# Patient Record
Sex: MALE | Race: WHITE | HISPANIC OR LATINO | Employment: UNEMPLOYED | URBAN - METROPOLITAN AREA
[De-identification: names, ages, dates, MRNs, and addresses within clinical notes are randomized per-mention and may not be internally consistent; named-entity substitution may affect disease eponyms.]

---

## 2017-08-19 ENCOUNTER — RESOLUTE PROFESSIONAL BILLING HOSPITAL PROF FEE (OUTPATIENT)
Dept: HOSPITALIST | Facility: MEDICAL CENTER | Age: 47
End: 2017-08-19
Payer: COMMERCIAL

## 2017-08-19 ENCOUNTER — HOSPITAL ENCOUNTER (OUTPATIENT)
Facility: MEDICAL CENTER | Age: 47
End: 2017-08-21
Attending: EMERGENCY MEDICINE | Admitting: HOSPITALIST
Payer: COMMERCIAL

## 2017-08-19 DIAGNOSIS — K72.10 END STAGE LIVER DISEASE (HCC): ICD-10-CM

## 2017-08-19 DIAGNOSIS — N30.00 ACUTE CYSTITIS WITHOUT HEMATURIA: ICD-10-CM

## 2017-08-19 DIAGNOSIS — K76.82 HEPATIC ENCEPHALOPATHY (HCC): ICD-10-CM

## 2017-08-19 DIAGNOSIS — D64.9 CHRONIC ANEMIA: ICD-10-CM

## 2017-08-19 PROBLEM — N18.30 CHRONIC KIDNEY DISEASE, STAGE III (MODERATE): Status: ACTIVE | Noted: 2017-08-19

## 2017-08-19 PROBLEM — N39.0 UTI (URINARY TRACT INFECTION): Status: ACTIVE | Noted: 2017-08-19

## 2017-08-19 PROBLEM — D69.6 THROMBOCYTOPENIA (HCC): Status: ACTIVE | Noted: 2017-08-19

## 2017-08-19 PROBLEM — E87.1 HYPONATREMIA: Status: ACTIVE | Noted: 2017-08-19

## 2017-08-19 LAB
ABO GROUP BLD: NORMAL
ALBUMIN SERPL BCP-MCNC: 2 G/DL (ref 3.2–4.9)
ALBUMIN/GLOB SERPL: 0.6 G/DL
ALP SERPL-CCNC: 123 U/L (ref 30–99)
ALT SERPL-CCNC: 57 U/L (ref 2–50)
AMMONIA PLAS-SCNC: 45 UMOL/L (ref 11–45)
ANION GAP SERPL CALC-SCNC: 6 MMOL/L (ref 0–11.9)
APTT PPP: 63.9 SEC (ref 24.7–36)
AST SERPL-CCNC: 93 U/L (ref 12–45)
BASOPHILS # BLD AUTO: 0.3 % (ref 0–1.8)
BASOPHILS # BLD: 0.02 K/UL (ref 0–0.12)
BILIRUB SERPL-MCNC: 14 MG/DL (ref 0.1–1.5)
BLD GP AB SCN SERPL QL: NORMAL
BUN SERPL-MCNC: 36 MG/DL (ref 8–22)
CALCIUM SERPL-MCNC: 8.4 MG/DL (ref 8.5–10.5)
CHLORIDE SERPL-SCNC: 100 MMOL/L (ref 96–112)
CO2 SERPL-SCNC: 17 MMOL/L (ref 20–33)
COMMENT 1642: NORMAL
CREAT SERPL-MCNC: 1.55 MG/DL (ref 0.5–1.4)
EOSINOPHIL # BLD AUTO: 0 K/UL (ref 0–0.51)
EOSINOPHIL NFR BLD: 0 % (ref 0–6.9)
ERYTHROCYTE [DISTWIDTH] IN BLOOD BY AUTOMATED COUNT: 61.3 FL (ref 35.9–50)
GFR SERPL CREATININE-BSD FRML MDRD: 49 ML/MIN/1.73 M 2
GLOBULIN SER CALC-MCNC: 3.6 G/DL (ref 1.9–3.5)
GLUCOSE SERPL-MCNC: 95 MG/DL (ref 65–99)
HCT VFR BLD AUTO: 25.6 % (ref 42–52)
HGB BLD-MCNC: 8.6 G/DL (ref 14–18)
IMM GRANULOCYTES # BLD AUTO: 0.07 K/UL (ref 0–0.11)
IMM GRANULOCYTES NFR BLD AUTO: 0.9 % (ref 0–0.9)
INR PPP: 4.52 (ref 0.87–1.13)
LIPASE SERPL-CCNC: 21 U/L (ref 11–82)
LYMPHOCYTES # BLD AUTO: 0.22 K/UL (ref 1–4.8)
LYMPHOCYTES NFR BLD: 2.8 % (ref 22–41)
MCH RBC QN AUTO: 33.6 PG (ref 27–33)
MCHC RBC AUTO-ENTMCNC: 33.6 G/DL (ref 33.7–35.3)
MCV RBC AUTO: 100 FL (ref 81.4–97.8)
MONOCYTES # BLD AUTO: 0.34 K/UL (ref 0–0.85)
MONOCYTES NFR BLD AUTO: 4.3 % (ref 0–13.4)
MORPHOLOGY BLD-IMP: NORMAL
NEUTROPHILS # BLD AUTO: 7.35 K/UL (ref 1.82–7.42)
NEUTROPHILS NFR BLD: 91.7 % (ref 44–72)
NRBC # BLD AUTO: 0 K/UL
NRBC BLD AUTO-RTO: 0 /100 WBC
PLATELET # BLD AUTO: 34 K/UL (ref 164–446)
PLATELETS.RETICULATED NFR BLD AUTO: 1.4 K/UL (ref 0.6–13.1)
PMV BLD AUTO: 10.4 FL (ref 9–12.9)
POTASSIUM SERPL-SCNC: 5.2 MMOL/L (ref 3.6–5.5)
PROT SERPL-MCNC: 5.6 G/DL (ref 6–8.2)
PROTHROMBIN TIME: 44.3 SEC (ref 12–14.6)
RBC # BLD AUTO: 2.56 M/UL (ref 4.7–6.1)
RH BLD: NORMAL
SODIUM SERPL-SCNC: 123 MMOL/L (ref 135–145)
WBC # BLD AUTO: 8 K/UL (ref 4.8–10.8)

## 2017-08-19 PROCEDURE — 302146: Performed by: HOSPITALIST

## 2017-08-19 PROCEDURE — 86900 BLOOD TYPING SEROLOGIC ABO: CPT

## 2017-08-19 PROCEDURE — G0378 HOSPITAL OBSERVATION PER HR: HCPCS

## 2017-08-19 PROCEDURE — 85610 PROTHROMBIN TIME: CPT

## 2017-08-19 PROCEDURE — 82140 ASSAY OF AMMONIA: CPT

## 2017-08-19 PROCEDURE — 86850 RBC ANTIBODY SCREEN: CPT

## 2017-08-19 PROCEDURE — 85055 RETICULATED PLATELET ASSAY: CPT

## 2017-08-19 PROCEDURE — 80053 COMPREHEN METABOLIC PANEL: CPT

## 2017-08-19 PROCEDURE — 99223 1ST HOSP IP/OBS HIGH 75: CPT | Performed by: HOSPITALIST

## 2017-08-19 PROCEDURE — 99285 EMERGENCY DEPT VISIT HI MDM: CPT

## 2017-08-19 PROCEDURE — 82272 OCCULT BLD FECES 1-3 TESTS: CPT

## 2017-08-19 PROCEDURE — 83690 ASSAY OF LIPASE: CPT

## 2017-08-19 PROCEDURE — A9270 NON-COVERED ITEM OR SERVICE: HCPCS | Performed by: HOSPITALIST

## 2017-08-19 PROCEDURE — 85025 COMPLETE CBC W/AUTO DIFF WBC: CPT

## 2017-08-19 PROCEDURE — 700102 HCHG RX REV CODE 250 W/ 637 OVERRIDE(OP): Performed by: HOSPITALIST

## 2017-08-19 PROCEDURE — 85730 THROMBOPLASTIN TIME PARTIAL: CPT

## 2017-08-19 PROCEDURE — 86901 BLOOD TYPING SEROLOGIC RH(D): CPT

## 2017-08-19 RX ORDER — CARVEDILOL 6.25 MG/1
6.25 TABLET ORAL 2 TIMES DAILY WITH MEALS
Status: DISCONTINUED | OUTPATIENT
Start: 2017-08-19 | End: 2017-08-20

## 2017-08-19 RX ORDER — OMEPRAZOLE 20 MG/1
40 CAPSULE, DELAYED RELEASE ORAL DAILY
Status: DISCONTINUED | OUTPATIENT
Start: 2017-08-19 | End: 2017-08-21 | Stop reason: HOSPADM

## 2017-08-19 RX ORDER — THIAMINE MONONITRATE (VIT B1) 100 MG
100 TABLET ORAL DAILY
Status: DISCONTINUED | OUTPATIENT
Start: 2017-08-19 | End: 2017-08-21 | Stop reason: HOSPADM

## 2017-08-19 RX ORDER — SUCRALFATE ORAL 1 G/10ML
1 SUSPENSION ORAL
Status: DISCONTINUED | OUTPATIENT
Start: 2017-08-19 | End: 2017-08-21 | Stop reason: HOSPADM

## 2017-08-19 RX ORDER — LACTULOSE 20 G/30ML
30 SOLUTION ORAL 3 TIMES DAILY
Status: DISCONTINUED | OUTPATIENT
Start: 2017-08-19 | End: 2017-08-21 | Stop reason: HOSPADM

## 2017-08-19 RX ADMIN — OMEPRAZOLE 40 MG: 20 CAPSULE, DELAYED RELEASE ORAL at 09:25

## 2017-08-19 RX ADMIN — SUCRALFATE 1 G: 1 SUSPENSION ORAL at 19:07

## 2017-08-19 RX ADMIN — CARVEDILOL 6.25 MG: 6.25 TABLET, FILM COATED ORAL at 09:25

## 2017-08-19 RX ADMIN — CARVEDILOL 6.25 MG: 6.25 TABLET, FILM COATED ORAL at 19:07

## 2017-08-19 RX ADMIN — LACTULOSE 30 ML: 10 SOLUTION ORAL at 21:27

## 2017-08-19 RX ADMIN — SUCRALFATE 1 G: 1 SUSPENSION ORAL at 06:52

## 2017-08-19 RX ADMIN — LACTULOSE 30 ML: 10 SOLUTION ORAL at 09:25

## 2017-08-19 RX ADMIN — SUCRALFATE 1 G: 1 SUSPENSION ORAL at 21:27

## 2017-08-19 RX ADMIN — Medication 100 MG: at 09:25

## 2017-08-19 ASSESSMENT — ENCOUNTER SYMPTOMS
NAUSEA: 0
DIZZINESS: 0
TINGLING: 0
DEPRESSION: 0
FOCAL WEAKNESS: 0
CHILLS: 0
ABDOMINAL PAIN: 0
FEVER: 0
DIARRHEA: 0
SHORTNESS OF BREATH: 0
VOMITING: 0
MYALGIAS: 0
PHOTOPHOBIA: 0
PALPITATIONS: 0
COUGH: 0
WHEEZING: 0
HEADACHES: 0
SORE THROAT: 0

## 2017-08-19 ASSESSMENT — PATIENT HEALTH QUESTIONNAIRE - PHQ9
1. LITTLE INTEREST OR PLEASURE IN DOING THINGS: NOT AT ALL
2. FEELING DOWN, DEPRESSED, IRRITABLE, OR HOPELESS: NOT AT ALL
SUM OF ALL RESPONSES TO PHQ QUESTIONS 1-9: 0
2. FEELING DOWN, DEPRESSED, IRRITABLE, OR HOPELESS: NOT AT ALL
SUM OF ALL RESPONSES TO PHQ9 QUESTIONS 1 AND 2: 0
SUM OF ALL RESPONSES TO PHQ QUESTIONS 1-9: 0
SUM OF ALL RESPONSES TO PHQ9 QUESTIONS 1 AND 2: 0
1. LITTLE INTEREST OR PLEASURE IN DOING THINGS: NOT AT ALL

## 2017-08-19 ASSESSMENT — PAIN SCALES - GENERAL
PAINLEVEL_OUTOF10: 6
PAINLEVEL_OUTOF10: 0

## 2017-08-19 ASSESSMENT — LIFESTYLE VARIABLES
EVER_SMOKED: NEVER
REASON UNABLE TO ASSESS: N
ALCOHOL_USE: NO

## 2017-08-19 NOTE — PROGRESS NOTES
Assessment complete. A&Ox4. C/o pain 0/10. Will be medicated per MAR prn. Discussed POC w/ pt. Educated on medications per MAR. Discussed the importance of calling for needs and before getting OOB to prevent injury/falls. Pt verbalizes understanding. Call light in reach. Bed in lowest position. No other needs identified. Will continue hourly rounding.

## 2017-08-19 NOTE — ED NOTES
Janice from Lab called with critical result of 34 PT at 542. Critical lab result read back to Janice.   Dr. Lemus and Dr Kemp notified of critical lab result at 543.  Critical lab result read back by Dr. Lemus and Dr Kemp.

## 2017-08-19 NOTE — ASSESSMENT & PLAN NOTE
Has improved w holding diuretics, suspect he was dry.  -Resume lasix/spirono at half dose  -Cr in AM  -Monitor Cr Daily  -Watch UOP  -Hold nephrotoxins

## 2017-08-19 NOTE — ASSESSMENT & PLAN NOTE
The patient's encephalopathy has resolved to his baseline. Suspect this is secondary to his medical noncompliance with his lactulose versus under dosing as he has not had consistent bowel movements. I will resume his home medications including his Xifaxan and monitor him closely. The patient carries a known history of alcoholic liver cirrhosis with associated coagulopathy. He stopped drinking in January. Blood alcohol levels drawn at the outlying facility are negative.  -Increase lactulose  -Resume rifaximin

## 2017-08-19 NOTE — ED NOTES
Room 4    BIB [facility] from [place] for GI bleed, Cirrhosis of the liver, AMS,  kidney and liver failure.  Pt has hx of varices and bleeds.  Pt is A&Ox4, Jaundice and states no pain except in LEFT hip.    H&H of 8.4 and 24.4   Potassium at 4.9  Ammonia 99  And liver function HIGH     Given Protonix 80mg IV  Lactose 20mg PO  Rocephin 1G IV   Octreotide 50mcg continues here at [facility]    .  Chief Complaint   Patient presents with   • GI Problem     hx of GI bleeds and varcies

## 2017-08-19 NOTE — PROGRESS NOTES
Patient admitted after midnight by Dr Kemp, please see H&P for full details.    Pt seen and examined by myself, today     Hospital Course:  Froylan Catherine is a 47 y.o. male w/ h/o ETOH liver cirrhosis  admitted 8/19/2017 for altered mentation. Patient carries a history of known alcoholic liver cirrhosis with previous GI bleed secondary to esophageal varices. Upon arrival at the [facility] facility, the patient's mentation had apparently improved. He was noted to have elevated ammonia levels and was transferred to our facility for higher level of care.  The patient admits that he has not been taking his lactulose as ordered. He does have an elevated ammonia level however his mentation has remained stable. At the [facility] facility, the patient had a guaiac positive stool but no evidence of gross bleeding. He states that he has had no alcohol since January and his blood alcohol level is negative    Assessment and plan:  Hepatic encephalopathy (CMS-HCC)  The patient's encephalopathy has resolved. Suspect this is secondary to his medical noncompliance with his lactulose. I will resume his home medications including his Xifaxan and monitor him closely. The patient carries a known history of alcoholic liver cirrhosis with associated coagulopathy. He stopped drinking in January. Blood alcohol levels drawn at the [facility] are negative.    UTI (urinary tract infection)  Patient has a positive urinalysis, he will be covered empirically with ceftriaxone and will monitor urine cultures.    Thrombocytopenia (CMS-HCC)  The patient's platelet levels have dropped in the past year. This is secondary to his liver disease and poor synthetic function. He did have a guaiac positive stool at the [facility] facility but he has no evidence of active GI bleed. We will monitor him closely during this hospitalization and trend his platelet levels.    Hyponatremia  The patient's sodium levels are lower than his previous  hospitalization. Again this is likely secondary to his liver disease. His mentation at the time of my exam at bedside was alert and oriented. We will continue to monitor, we may need to initiate fluid restrictions.    Chronic kidney disease, stage III (moderate)  This is chronic and the patient is at his baseline, we will continue to monitor.          Dispo: cont to monitor, labs in AM, possible dc tomorrow if stable    Patient was discussed with bedside RN/SW\     Vanessa Khan NP

## 2017-08-19 NOTE — PROGRESS NOTES
Pt awake,a&ox4,answer questions,follow commands,pt very jaundiced,pt repositioned,c/o pain on left hip when moved,pillows kept to support,poc explained.

## 2017-08-19 NOTE — ASSESSMENT & PLAN NOTE
The patient's platelet levels have dropped in the past year. This is secondary to his liver disease and poor synthetic function. He did have a guaiac positive stool at the outlying facility but he has no evidence of active GI bleed. We will monitor him closely during this hospitalization and trend his platelet levels.  -Monitor for bleeding

## 2017-08-19 NOTE — PROGRESS NOTES
Pt currently no taking medications.  Pt should be taking   LACTULOSE  20 g tid  CARVEDILOL 6.25 mg bid  OMEPRAZOLE 40 mg daily    No recent antibiotics in last 30 days.

## 2017-08-19 NOTE — IP AVS SNAPSHOT
" Home Care Instructions                                                                                                                  Name:Froylan Catherine  Medical Record Number:[id number]  CSN: [id number]    YOB: 1970   Age: 47 y.o.  Sex: male  HT:1.778 m (5' 10\") WT: 112.5 kg (248 lb 0.3 oz)          Admit Date: 8/19/2017     Discharge Date:   Today's Date: 8/21/2017  Attending Doctor:  Yasmin Kaplan D.O.                  Allergies:  Review of patient's allergies indicates no known allergies.            Discharge Instructions       Discharge Instructions    Discharged to home by car with relative. Discharged via wheelchair, hospital escort: Yes.  Special equipment needed: Wheelchair    Be sure to schedule a follow-up appointment with your primary care doctor or any specialists as instructed.     Discharge Plan:   Diet Plan: Discussed  Activity Level: Discussed  Confirmed Follow up Appointment: Appointment Scheduled  Confirmed Symptoms Management: Discussed  Medication Reconciliation Updated: Yes  Influenza Vaccine Indication: Indicated: Not available from distributor/    I understand that a diet low in cholesterol, fat, and sodium is recommended for good health. Unless I have been given specific instructions below for another diet, I accept this instruction as my diet prescription.   Other diet: Low Sodium Diet    Special Instructions: None    · Is patient discharged on Warfarin / Coumadin?   No     · Is patient Post Blood Transfusion?  No    Depression / Suicide Risk    As you are discharged from this [facility] facility, it is important to learn how to keep safe from harming yourself.    Recognize the warning signs:  · Abrupt changes in personality, positive or negative- including increase in energy   · Giving away possessions  · Change in eating patterns- significant weight changes-  positive or negative  · Change in sleeping patterns- unable to sleep or sleeping all the " time   · Unwillingness or inability to communicate  · Depression  · Unusual sadness, discouragement and loneliness  · Talk of wanting to die  · Neglect of personal appearance   · Rebelliousness- reckless behavior  · Withdrawal from people/activities they love  · Confusion- inability to concentrate     If you or a loved one observes any of these behaviors or has concerns about self-harm, here's what you can do:  · Talk about it- your feelings and reasons for harming yourself  · Remove any means that you might use to hurt yourself (examples: pills, rope, extension cords, firearm)  · Get professional help from the community (Mental Health, Substance Abuse, psychological counseling)  · Do not be alone:Call your Safe Contact- someone whom you trust who will be there for you.  · Call your local CRISIS HOTLINE 832-7158 or 098-508-0828  · Call your local Children's Mobile Crisis Response Team [place] (788) 371-5675 or www.Urjanet  · Call the toll free National Suicide Prevention Hotlines   · National Suicide Prevention Lifeline 442-480-URCA (7347)  · [facility] Network 800-SUICIDE (145-7168)        Follow-up Information     1. Schedule an appointment as soon as possible for a visit with Héctor Cifuentes M.D..    Specialty:  Family Medicine    Contact information    [address] [facility] [address] [address] [phone]          2. Follow up with [facility] transplant center. Go on 8/22/2017.         Discharge Medication Instructions:    Below are the medications your physician expects you to take upon discharge:    Review all your home medications and newly ordered medications with your doctor and/or pharmacist. Follow medication instructions as directed by your doctor and/or pharmacist.    Please keep your medication list with you and share with your physician.               Medication List      CHANGE how you take these medications        Instructions    Morning Afternoon Evening Bedtime    lactulose 10 GM/15ML Soln    What changed:  when to take this   Last time this was given:  30 mL on 8/21/2017  9:13 AM        Take 30 mL by mouth 2 times a day.   Dose:  20 g                          CONTINUE taking these medications        Instructions    Morning Afternoon Evening Bedtime    carvedilol 6.25 MG Tabs   Last time this was given:  6.25 mg on 8/20/2017  6:47 AM   Commonly known as:  COREG        Take 6.25 mg by mouth 2 times a day, with meals.   Dose:  6.25 mg                        omeprazole 20 MG delayed-release capsule   Last time this was given:  40 mg on 8/21/2017  9:14 AM   Commonly known as:  PRILOSEC        Take 40 mg by mouth every day.   Dose:  40 mg                        rifaximin 550 MG Tabs tablet   Last time this was given:  550 mg on 8/21/2017  9:14 AM   Commonly known as:  XIFAXAN        Take 550 mg by mouth 2 Times a Day.   Dose:  550 mg                        therapeutic multivitamin-minerals Tabs        Take 1 Tab by mouth every day.   Dose:  1 Tab                          STOP taking these medications     furosemide 40 MG Tabs   Commonly known as:  LASIX               spironolactone 25 MG Tabs   Commonly known as:  ALDACTONE                    Where to Get Your Medications      Information about where to get these medications is not yet available     ! Ask your nurse or doctor about these medications    - lactulose 10 GM/15ML Soln            Instructions           Diet / Nutrition:    Follow any diet instructions given to you by your doctor or the dietician, including how much salt (sodium) you are allowed each day.    If you are overweight, talk to your doctor about a weight reduction plan.    Activity:    Remain physically active following your doctor's instructions about exercise and activity.    Rest often.     Any time you become even a little tired or short of breath, SIT DOWN and rest.    Worsening Symptoms:    Report any of the following signs and symptoms to the doctor's office immediately:    *Pain  of jaw, arm, or neck  *Chest pain not relieved by medication                               *Dizziness or loss of consciousness  *Difficulty breathing even when at rest   *More tired than usual                                       *Bleeding drainage or swelling of surgical site  *Swelling of feet, ankles, legs or stomach                 *Fever (>100ºF)  *Pink or blood tinged sputum  *Weight gain (3lbs/day or 5lbs /week)           *Shock from internal defibrillator (if applicable)  *Palpitations or irregular heartbeats                *Cool and/or numb extremities    Stroke Awareness    Common Risk Factors for Stroke include:    Age  Atrial Fibrillation  Carotid Artery Stenosis  Diabetes Mellitus  Excessive alcohol consumption  High blood pressure  Overweight   Physical inactivity  Smoking    Warning signs and symptoms of a stroke include:    *Sudden numbness or weakness of the face, arm or leg (especially on one side of the body).  *Sudden confusion, trouble speaking or understanding.  *Sudden trouble seeing in one or both eyes.  *Sudden trouble walking, dizziness, loss of balance or coordination.Sudden severe headache with no known cause.    It is very important to get treatment quickly when a stroke occurs. If you experience any of the above warning signs, call 911 immediately.                   Disclaimer         Quit Smoking / Tobacco Use:    I understand the use of any tobacco products increases my chance of suffering from future heart disease or stroke and could cause other illnesses which may shorten my life. Quitting the use of tobacco products is the single most important thing I can do to improve my health. For further information on smoking / tobacco cessation call a Toll Free Quit Line at 1-378.537.2374 (*National [employer]) or 1-778.408.1158 (American Lung Association) or you can access the web based program at www.lungusa.org.    [place] Tobacco Users Help Line:  (755) 971-1835       Toll Free:  5-451-971-8774  Quit Tobacco Program [facility] (522)179-3228    Crisis Hotline:    National Crisis Hotline:  8-458-BUJBYCE or 1-813.968.9854    [place] Crisis Hotline:    1-512.894.2354 or 583-225-5627    Discharge Survey:   Thank you for choosing [facility]. We hope we did everything we could to make your hospital stay a pleasant one. You may be receiving a phone survey and we would appreciate your time and participation in answering the questions. Your input is very valuable to us in our efforts to improve our service to our patients and their families.        My signature on this form indicates that:    1. I have reviewed and understand the above information.  2. My questions regarding this information have been answered to my satisfaction.  3. I have formulated a plan with my discharge nurse to obtain my prescribed medications for home.                  Disclaimer         __________________________________                     __________       ________                       Patient Signature                                                 Date                    Time

## 2017-08-19 NOTE — ED PROVIDER NOTES
ED Provider Note    Scribed for Carlos Lemus M.D. by Nayeli Berrios. 8/19/2017  4:41 AM    Primary care provider: Pcp Pt States None  Means of arrival: Med Flight  History obtained from: Patient/transferring records  History limited by: None    CHIEF COMPLAINT  Chief Complaint   Patient presents with   • GI Problem     hx of GI bleeds and varcies      HPI  Froylan Catherine is a 47 y.o. male with a history of end stage liver disease who presents to the Emergency Department transferred from [facility] where he originally presented with altered mental status last night. He was found to have Guaiac positive stool and a UTI so he was treated with Rocephin IV 1g, Lactulose, Protonix and Octreotide prior to arrival. His only complaint at this time is left hip pain, and he denies any chest pain, abdominal pain, vomiting or diarrhea. He denies confusion and is alert and oriented by 4. He has not drank since January.     REVIEW OF SYSTEMS  Pertinent positives include hip pain, confusion (resolved).   Pertinent negatives include no chest pain, abdominal pain, vomiting, diarrhea.    All other systems reviewed and negative.      PAST MEDICAL HISTORY   has a past medical history of Congestive heart failure (CMS-HCC); Diabetes (CMS-HCC); and Anemia ([place]).    SURGICAL HISTORY   has past surgical history that includes gastroscopy with banding (10/26/2014) and gastroscopy-endo (N/A, 3/21/2016).    SOCIAL HISTORY  Social History   Substance Use Topics   • Smoking status: Never Smoker    • Smokeless tobacco: None   • Alcohol Use: No      Comment: stopped in January       History   Drug Use No     FAMILY HISTORY  History reviewed. No pertinent family history.    CURRENT MEDICATIONS  Current Outpatient Prescriptions on File Prior to Encounter   Medication Sig Dispense Refill   • lactobacillus granules (LACTINEX/FLORANEX) Pack Take 1 Packet by mouth 3 times a day, with meals. 90 Packet 3   • carvedilol (COREG)  "6.25 MG Tab Take 1 Tab by mouth 2 times a day, with meals. 60 Tab 3   • Calcium Carb-Cholecalciferol (OYSTER SHELL CALCIUM/VITAMIN D) 250-125 MG-UNIT Tab tablet Take 1 Tab by mouth 2 Times a Day. 60 Tab 3   • omeprazole (PRILOSEC) 40 MG delayed-release capsule Take 1 Cap by mouth every day. 30 Cap 3   • sucralfate (CARAFATE) 1 GM/10ML Suspension Take 10 mL by mouth 4 Times a Day,Before Meals and at Bedtime. 300 mL 3   • thiamine (THIAMINE) 100 MG tablet Take 1 Tab by mouth every day. 30 Tab 3   • rifaximin (XIFAXAN) 550 MG TABS tablet Take 1 Tab by mouth 2 Times a Day. (Patient not taking: Reported on 3/18/2016) 60 Tab 2     ALLERGIES  No Known Allergies    PHYSICAL EXAM  VITAL SIGNS: /53 mmHg  Pulse 79  Resp 16  Ht 1.6 m (5' 3\")  Wt 99.791 kg (220 lb)  BMI 38.98 kg/m2  SpO2 98%    Nursing note and vitals reviewed.  Constitutional: Stigmata of end stage liver disease  HENT: Head is normocephalic and atraumatic. Oropharynx is clear and moist without exudate or erythema.   Eyes: Pupils are equal, round, and reactive to light. Scleral icterus noted.   Cardiovascular: Normal rate and regular rhythm. No murmur heard. Normal radial pulses.  Pulmonary/Chest: Breath sounds normal. No wheezes or rales.   Abdominal: Soft and non-tender. No distention    Musculoskeletal: Extremities exhibit normal range of motion without edema or tenderness.   Neurological: Awake, alert and oriented to person, place, and time. No focal deficits noted.  Skin: Skin is jaundiced, warm and dry. No rash.   Psychiatric: Normal mood and affect. Appropriate for clinical situation    DIAGNOSTIC STUDIES / PROCEDURES    LABS  Results for orders placed or performed during the hospital encounter of 08/19/17   CBC WITH DIFFERENTIAL   Result Value Ref Range    WBC 8.0 4.8 - 10.8 K/uL    RBC 2.56 (L) 4.70 - 6.10 M/uL    Hemoglobin 8.6 (L) 14.0 - 18.0 g/dL    Hematocrit 25.6 (L) 42.0 - 52.0 %    .0 (H) 81.4 - 97.8 fL    MCH 33.6 (H) 27.0 - " 33.0 pg    MCHC 33.6 (L) 33.7 - 35.3 g/dL    RDW 61.3 (H) 35.9 - 50.0 fL    Platelet Count 34 (LL) 164 - 446 K/uL    MPV 10.4 9.0 - 12.9 fL    Neutrophils-Polys 91.70 (H) 44.00 - 72.00 %    Lymphocytes 2.80 (L) 22.00 - 41.00 %    Monocytes 4.30 0.00 - 13.40 %    Eosinophils 0.00 0.00 - 6.90 %    Basophils 0.30 0.00 - 1.80 %    Immature Granulocytes 0.90 0.00 - 0.90 %    Nucleated RBC 0.00 /100 WBC    Lymphs (Absolute) 0.22 (L) 1.00 - 4.80 K/uL    Monos (Absolute) 0.34 0.00 - 0.85 K/uL    Eos (Absolute) 0.00 0.00 - 0.51 K/uL    Baso (Absolute) 0.02 0.00 - 0.12 K/uL    Immature Granulocytes (abs) 0.07 0.00 - 0.11 K/uL    NRBC (Absolute) 0.00 K/uL    Neutrophils (Absolute) 7.35 1.82 - 7.42 K/uL   COMP METABOLIC PANEL   Result Value Ref Range    Sodium 123 (L) 135 - 145 mmol/L    Potassium 5.2 3.6 - 5.5 mmol/L    Chloride 100 96 - 112 mmol/L    Co2 17 (L) 20 - 33 mmol/L    Anion Gap 6.0 0.0 - 11.9    Glucose 95 65 - 99 mg/dL    Bun 36 (H) 8 - 22 mg/dL    Creatinine 1.55 (H) 0.50 - 1.40 mg/dL    Calcium 8.4 (L) 8.5 - 10.5 mg/dL    AST(SGOT) 93 (H) 12 - 45 U/L    ALT(SGPT) 57 (H) 2 - 50 U/L    Alkaline Phosphatase 123 (H) 30 - 99 U/L    Total Bilirubin 14.0 (H) 0.1 - 1.5 mg/dL    Albumin 2.0 (L) 3.2 - 4.9 g/dL    Total Protein 5.6 (L) 6.0 - 8.2 g/dL    Globulin 3.6 (H) 1.9 - 3.5 g/dL    A-G Ratio 0.6 g/dL   LIPASE   Result Value Ref Range    Lipase 21 11 - 82 U/L   PROTHROMBIN TIME   Result Value Ref Range    PT 44.3 (H) 12.0 - 14.6 sec    INR 4.52 (H) 0.87 - 1.13   APTT   Result Value Ref Range    APTT 63.9 (H) 24.7 - 36.0 sec   AMMONIA   Result Value Ref Range    Ammonia 45 11 - 45 umol/L   ESTIMATED GFR   Result Value Ref Range    GFR If  59 (A) >60 mL/min/1.73 m 2    GFR If Non African American 49 (A) >60 mL/min/1.73 m 2   PERIPHERAL SMEAR REVIEW   Result Value Ref Range    Peripheral Smear Review see below    IMMATURE PLT FRACTION   Result Value Ref Range    Imm. Plt Fraction 1.4 0.6 - 13.1 K/uL    DIFFERENTIAL COMMENT   Result Value Ref Range    Comments-Diff see below      All labs reviewed by me.    COURSE & MEDICAL DECISION MAKING  Nursing notes, VS, PMSFHx reviewed in chart.     Review of past medical records shows the patient transferred here from an outside hospital. He was last here in March 2016 for an upper GI bleed with esophageal varices      4:41 AM - Reviewed patient's transferring records which showed that he was seen at [facility] for altered mental status. Rectal exam is guaiac positive, brown stool. Hemoglobin was 8.7, ammonia was 99. Sodium 125, creatinine 1.5, platelets 39,000. Bilirubin 12.6, INR 2.6. Urinalysis is consistent with UTI, was treated with Rocephin IV 1 g prior to transfer.     4:41 AM - Patient seen and examined at bedside. Ordered CBC with differential, CMP, lipase, prothrombin time, APTT, ammonia labs to evaluate his symptoms.    4:49 AM - I consulted with the hospitalist, Dr. Kemp, who agreed to admit the patient to the hospital.     6:24 AM - ED testing reveals patient's hemoglobin is 8.6 which is improved from when he was last admitted to [facility] with a hemoglobin of 7.1. His ammonia is now 45, decreased from ammonia of 99 at transferring facility. The patient is hyponatremic with a sodium of 123 which is decreased from prior [facility] hospitalizations where sodium was 130 at its lowest. Patient has been admitted to the clinical decision unit for further evaluation.     DISPOSITION:  Patient will be admitted to hospitalist, Dr. Kemp, in stable condition.    FINAL IMPRESSION  1. Hepatic encephalopathy (CMS-HCC)    2. Acute cystitis without hematuria    3. End stage liver disease (CMS-HCC)    4. Chronic anemia        Nayeli MUIR (Mayuri), am scribing for, and in the presence of, Carlos Lemus M.D..    Electronically signed by: Nayeli Berrios (Mayuri), 8/19/2017    Carlos MUIR M.D. personally performed the services described in this  documentation, as scribed by Nayeli Berrios in my presence, and it is both accurate and complete.    The note accurately reflects work and decisions made by me.  Carlos Lemus  8/19/2017  11:00 AM

## 2017-08-19 NOTE — PROGRESS NOTES
[place]/ [place] tech. Pt as changed from gurney to bed via sheet slideboard. Pt resting in bed. Refuses to have sheet slideboard taken out from under him. Per pt cannot ambulate secondary to L hip pain.

## 2017-08-19 NOTE — ASSESSMENT & PLAN NOTE
The patient's sodium levels are lower than his previous hospitalization. Again this is likely secondary to his liver disease. His mentation at the time of my exam at bedside was alert and oriented. We will continue to monitor, we may need to initiate fluid restrictions.  -Resume Lasix 20  -Spironolactone 50

## 2017-08-19 NOTE — H&P
Hospital Medicine History and Physical    Date of Service  8/19/2017    Chief Complaint  Chief Complaint   Patient presents with   • GI Problem     hx of GI bleeds and varcies        History of Presenting Illness  47 y.o. male [facility] for altered mentation. Patient carries a history of known alcoholic liver cirrhosis with previous GI bleed secondary to esophageal varices. [facility], the patient's mentation had apparently improved. He was noted to have elevated ammonia levels and was transferred to our facility for higher level of care.  The patient admits that he has not been taking his lactulose as ordered. He does have an elevated ammonia level however his mentation has remained stable. [facility] the patient had a guaiac positive stool but no evidence of gross bleeding. He states that he has had no alcohol since January and his blood alcohol level is negative. She reports no fevers, chills, nausea, vomiting. He's had no chest pain, abdominal pain, diarrhea or dysuria.    Primary Care Physician  Pcp Pt States None    Consultants  None.    Code Status  Full    Review of Systems  Review of Systems   Constitutional: Negative for fever and chills.   HENT: Negative for congestion and sore throat.    Eyes: Negative for photophobia.   Respiratory: Negative for cough, shortness of breath and wheezing.    Cardiovascular: Negative for chest pain and palpitations.   Gastrointestinal: Negative for nausea, vomiting, abdominal pain and diarrhea.   Genitourinary: Negative for dysuria.   Musculoskeletal: Negative for myalgias.   Skin: Negative.    Neurological: Negative for dizziness, tingling, focal weakness and headaches.   Psychiatric/Behavioral: Negative for depression and suicidal ideas.        Past Medical History  Past Medical History   Diagnosis Date   • Congestive heart failure (CMS-HCC)    • Diabetes  (CMS-[facility])    • Anemia PMH       Surgical History  Past Surgical History   Procedure Laterality Date   • Gastroscopy with banding  10/26/2014     Performed by Vijay England M.D. at ENDOSCOPY [facility]   • Gastroscopy-endo N/A 3/21/2016     Procedure: GASTROSCOPY-ENDO;  Surgeon: Pete Last M.D.;  Location: SURGERY [facility];  Service:        Medications  No current facility-administered medications on file prior to encounter.     Current Outpatient Prescriptions on File Prior to Encounter   Medication Sig Dispense Refill   • lactobacillus granules (LACTINEX/FLORANEX) Pack Take 1 Packet by mouth 3 times a day, with meals. 90 Packet 3   • carvedilol (COREG) 6.25 MG Tab Take 1 Tab by mouth 2 times a day, with meals. 60 Tab 3   • Calcium Carb-Cholecalciferol (OYSTER SHELL CALCIUM/VITAMIN D) 250-125 MG-UNIT Tab tablet Take 1 Tab by mouth 2 Times a Day. 60 Tab 3   • omeprazole (PRILOSEC) 40 MG delayed-release capsule Take 1 Cap by mouth every day. 30 Cap 3   • sucralfate (CARAFATE) 1 GM/10ML Suspension Take 10 mL by mouth 4 Times a Day,Before Meals and at Bedtime. 300 mL 3   • thiamine (THIAMINE) 100 MG tablet Take 1 Tab by mouth every day. 30 Tab 3   • rifaximin (XIFAXAN) 550 MG TABS tablet Take 1 Tab by mouth 2 Times a Day. (Patient not taking: Reported on 3/18/2016) 60 Tab 2       Family History  History reviewed. No pertinent family history.    Social History  Social History   Substance Use Topics   • Smoking status: Never Smoker    • Smokeless tobacco: None   • Alcohol Use: No      Comment: stopped in January        Allergies  No Known Allergies     Physical Exam  Laboratory   Hemodynamics  No data recorded.      Pulse  Av  Min: 78  Max: 80 Heart Rate (Monitored): 80  Blood Pressure: 116/53 mmHg, NIBP: 110/59 mmHg      Respiratory      Respiration: 20, Pulse Oximetry: 100 %             Physical Exam   Constitutional: He is oriented to person, place, and time. No distress.   HENT:   Head:  Normocephalic and atraumatic.   Right Ear: External ear normal.   Left Ear: External ear normal.   Scleral icterus   Eyes: EOM are normal. Right eye exhibits no discharge. Left eye exhibits no discharge.   Neck: Neck supple. No JVD present.   Cardiovascular: Normal rate, regular rhythm and normal heart sounds.    Pulmonary/Chest: Effort normal and breath sounds normal. No respiratory distress. He exhibits no tenderness.   Abdominal: Soft. Bowel sounds are normal. He exhibits no distension. There is no tenderness.   Musculoskeletal: He exhibits no edema.   Neurological: He is alert and oriented to person, place, and time. No cranial nerve deficit.   Skin: Skin is warm and dry. He is not diaphoretic. No erythema.   Mild jaundice   Psychiatric: He has a normal mood and affect. His behavior is normal.   Nursing note and vitals reviewed.          Recent Labs      08/19/17 0432   SODIUM  123*   POTASSIUM  5.2   CHLORIDE  100   CO2  17*   GLUCOSE  95   BUN  36*   CREATININE  1.55*   CALCIUM  8.4*     Recent Labs      08/19/17 0432   ALTSGPT  57*   ASTSGOT  93*   ALKPHOSPHAT  123*   TBILIRUBIN  14.0*   LIPASE  21   GLUCOSE  95     Recent Labs      08/19/17 0432   APTT  63.9*   INR  4.52*             Lab Results   Component Value Date    TROPONINI <0.01 09/10/2012       Imaging  No results found.   Assessment/Plan     Anticipate that patient will need less than 2 midnights for management of the discussed medical issues.    This dictation was created using voice recognition software. The accuracy of the dictation is limited to the abilities of the software. I expect there may be some errors of grammar and possibly content.    Hepatic encephalopathy (CMS-HCC)  Assessment & Plan  The patient's encephalopathy has resolved. Suspect this is secondary to his medical noncompliance with his lactulose. I will resume his home medications including his Xifaxan and monitor him closely. The patient carries a known history of  alcoholic liver cirrhosis with associated coagulopathy. He stopped drinking in January. Blood alcohol levels drawn at the outlying facility are negative.    UTI (urinary tract infection)  Assessment & Plan  Patient has a positive urinalysis, he will be covered empirically with ceftriaxone and will monitor urine cultures.    Thrombocytopenia (CMS-HCC)  Assessment & Plan  The patient's platelet levels have dropped in the past year. This is secondary to his liver disease and poor synthetic function. [facility] but he has no evidence of active GI bleed. We will monitor him closely during this hospitalization and trend his platelet levels.    Hyponatremia  Assessment & Plan  The patient's sodium levels are lower than his previous hospitalization. Again this is likely secondary to his liver disease. His mentation at the time of my exam at bedside was alert and oriented. We will continue to monitor, we may need to initiate fluid restrictions.    Chronic kidney disease, stage III (moderate)  Assessment & Plan  This is chronic and the patient is at his baseline, we will continue to monitor.      Prophylaxis: Sequential compression devices for DVT prophylaxis, no PPI indicated, stool softeners ordered

## 2017-08-19 NOTE — ASSESSMENT & PLAN NOTE
Patient has a positive urinalysis, he will be covered empirically with ceftriaxone and will monitor urine cultures.  -Will treat for 5 days given this is all located UTI.  -Follow up cultures

## 2017-08-20 PROBLEM — N17.9 AKI (ACUTE KIDNEY INJURY) (HCC): Status: ACTIVE | Noted: 2017-08-19

## 2017-08-20 PROBLEM — D68.9 COAGULOPATHY (HCC): Status: ACTIVE | Noted: 2017-08-20

## 2017-08-20 PROBLEM — D53.9 MACROCYTIC ANEMIA: Status: ACTIVE | Noted: 2017-08-20

## 2017-08-20 LAB
ALBUMIN SERPL BCP-MCNC: 1.6 G/DL (ref 3.2–4.9)
ALBUMIN/GLOB SERPL: 0.6 G/DL
ALP SERPL-CCNC: 98 U/L (ref 30–99)
ALT SERPL-CCNC: 46 U/L (ref 2–50)
ANION GAP SERPL CALC-SCNC: 4 MMOL/L (ref 0–11.9)
AST SERPL-CCNC: 69 U/L (ref 12–45)
BILIRUB SERPL-MCNC: 12.3 MG/DL (ref 0.1–1.5)
BUN SERPL-MCNC: 31 MG/DL (ref 8–22)
CALCIUM SERPL-MCNC: 8.1 MG/DL (ref 8.5–10.5)
CHLORIDE SERPL-SCNC: 102 MMOL/L (ref 96–112)
CO2 SERPL-SCNC: 19 MMOL/L (ref 20–33)
CREAT SERPL-MCNC: 1.25 MG/DL (ref 0.5–1.4)
ERYTHROCYTE [DISTWIDTH] IN BLOOD BY AUTOMATED COUNT: 64.8 FL (ref 35.9–50)
GFR SERPL CREATININE-BSD FRML MDRD: >60 ML/MIN/1.73 M 2
GLOBULIN SER CALC-MCNC: 2.9 G/DL (ref 1.9–3.5)
GLUCOSE SERPL-MCNC: 99 MG/DL (ref 65–99)
HCT VFR BLD AUTO: 20.8 % (ref 42–52)
HGB BLD-MCNC: 7 G/DL (ref 14–18)
MCH RBC QN AUTO: 34 PG (ref 27–33)
MCHC RBC AUTO-ENTMCNC: 33.7 G/DL (ref 33.7–35.3)
MCV RBC AUTO: 101 FL (ref 81.4–97.8)
MORPHOLOGY BLD-IMP: NORMAL
PLATELET # BLD AUTO: 25 K/UL (ref 164–446)
PLATELET BLD QL SMEAR: NORMAL
PLATELETS.RETICULATED NFR BLD AUTO: 1.7 K/UL (ref 0.6–13.1)
PMV BLD AUTO: 9.7 FL (ref 9–12.9)
POTASSIUM SERPL-SCNC: 4.7 MMOL/L (ref 3.6–5.5)
PROT SERPL-MCNC: 4.5 G/DL (ref 6–8.2)
RBC # BLD AUTO: 2.06 M/UL (ref 4.7–6.1)
SODIUM SERPL-SCNC: 125 MMOL/L (ref 135–145)
WBC # BLD AUTO: 5.2 K/UL (ref 4.8–10.8)

## 2017-08-20 PROCEDURE — A9270 NON-COVERED ITEM OR SERVICE: HCPCS | Performed by: HOSPITALIST

## 2017-08-20 PROCEDURE — 36415 COLL VENOUS BLD VENIPUNCTURE: CPT

## 2017-08-20 PROCEDURE — A9270 NON-COVERED ITEM OR SERVICE: HCPCS | Performed by: INTERNAL MEDICINE

## 2017-08-20 PROCEDURE — 85027 COMPLETE CBC AUTOMATED: CPT

## 2017-08-20 PROCEDURE — 700102 HCHG RX REV CODE 250 W/ 637 OVERRIDE(OP): Performed by: INTERNAL MEDICINE

## 2017-08-20 PROCEDURE — 85055 RETICULATED PLATELET ASSAY: CPT

## 2017-08-20 PROCEDURE — 96374 THER/PROPH/DIAG INJ IV PUSH: CPT

## 2017-08-20 PROCEDURE — 700102 HCHG RX REV CODE 250 W/ 637 OVERRIDE(OP): Performed by: HOSPITALIST

## 2017-08-20 PROCEDURE — 80053 COMPREHEN METABOLIC PANEL: CPT

## 2017-08-20 PROCEDURE — 700111 HCHG RX REV CODE 636 W/ 250 OVERRIDE (IP): Performed by: INTERNAL MEDICINE

## 2017-08-20 PROCEDURE — 99232 SBSQ HOSP IP/OBS MODERATE 35: CPT | Performed by: INTERNAL MEDICINE

## 2017-08-20 PROCEDURE — G0378 HOSPITAL OBSERVATION PER HR: HCPCS

## 2017-08-20 RX ORDER — LACTULOSE 10 G/15ML
20 SOLUTION ORAL DAILY
Status: ON HOLD | COMMUNITY
End: 2017-08-21

## 2017-08-20 RX ORDER — M-VIT,TX,IRON,MINS/CALC/FOLIC 27MG-0.4MG
1 TABLET ORAL DAILY
COMMUNITY

## 2017-08-20 RX ORDER — FUROSEMIDE 20 MG/1
20 TABLET ORAL
Status: DISCONTINUED | OUTPATIENT
Start: 2017-08-21 | End: 2017-08-21

## 2017-08-20 RX ORDER — PHYTONADIONE 5 MG/1
2.5 TABLET ORAL DAILY
Status: DISCONTINUED | OUTPATIENT
Start: 2017-08-20 | End: 2017-08-21 | Stop reason: HOSPADM

## 2017-08-20 RX ORDER — SPIRONOLACTONE 25 MG/1
100 TABLET ORAL DAILY
Status: ON HOLD | COMMUNITY
End: 2017-08-21

## 2017-08-20 RX ORDER — FUROSEMIDE 10 MG/ML
20 INJECTION INTRAMUSCULAR; INTRAVENOUS
Status: DISCONTINUED | OUTPATIENT
Start: 2017-08-20 | End: 2017-08-20

## 2017-08-20 RX ORDER — PROPRANOLOL HYDROCHLORIDE 10 MG/1
10 TABLET ORAL EVERY 8 HOURS
Status: DISCONTINUED | OUTPATIENT
Start: 2017-08-20 | End: 2017-08-21 | Stop reason: HOSPADM

## 2017-08-20 RX ORDER — OMEPRAZOLE 20 MG/1
40 CAPSULE, DELAYED RELEASE ORAL DAILY
COMMUNITY

## 2017-08-20 RX ORDER — CARVEDILOL 6.25 MG/1
6.25 TABLET ORAL 2 TIMES DAILY WITH MEALS
COMMUNITY

## 2017-08-20 RX ORDER — OMEPRAZOLE 20 MG/1
40 CAPSULE, DELAYED RELEASE ORAL DAILY
Status: DISCONTINUED | OUTPATIENT
Start: 2017-08-20 | End: 2017-08-20

## 2017-08-20 RX ORDER — SPIRONOLACTONE 50 MG/1
50 TABLET, FILM COATED ORAL
Status: DISCONTINUED | OUTPATIENT
Start: 2017-08-20 | End: 2017-08-21

## 2017-08-20 RX ORDER — FUROSEMIDE 40 MG/1
40 TABLET ORAL DAILY
Status: ON HOLD | COMMUNITY
End: 2017-08-21

## 2017-08-20 RX ADMIN — SUCRALFATE 1 G: 1 SUSPENSION ORAL at 18:30

## 2017-08-20 RX ADMIN — OMEPRAZOLE 40 MG: 20 CAPSULE, DELAYED RELEASE ORAL at 09:15

## 2017-08-20 RX ADMIN — LACTULOSE 30 ML: 10 SOLUTION ORAL at 16:18

## 2017-08-20 RX ADMIN — CARVEDILOL 6.25 MG: 6.25 TABLET, FILM COATED ORAL at 06:47

## 2017-08-20 RX ADMIN — SUCRALFATE 1 G: 1 SUSPENSION ORAL at 21:04

## 2017-08-20 RX ADMIN — SUCRALFATE 1 G: 1 SUSPENSION ORAL at 11:50

## 2017-08-20 RX ADMIN — Medication 100 MG: at 09:14

## 2017-08-20 RX ADMIN — LACTULOSE 30 ML: 10 SOLUTION ORAL at 21:04

## 2017-08-20 RX ADMIN — RIFAXIMIN 550 MG: 550 TABLET ORAL at 21:04

## 2017-08-20 RX ADMIN — PHYTONADIONE 2.5 MG: 5 TABLET ORAL at 11:51

## 2017-08-20 RX ADMIN — SPIRONOLACTONE 50 MG: 50 TABLET ORAL at 11:51

## 2017-08-20 RX ADMIN — SUCRALFATE 1 G: 1 SUSPENSION ORAL at 06:49

## 2017-08-20 RX ADMIN — LACTULOSE 30 ML: 10 SOLUTION ORAL at 09:15

## 2017-08-20 RX ADMIN — FUROSEMIDE 20 MG: 10 INJECTION, SOLUTION INTRAMUSCULAR; INTRAVENOUS at 11:50

## 2017-08-20 ASSESSMENT — ENCOUNTER SYMPTOMS
DIARRHEA: 0
SORE THROAT: 0
BLOOD IN STOOL: 0
BACK PAIN: 0
FEVER: 0
DEPRESSION: 0
ABDOMINAL PAIN: 0
BRUISES/BLEEDS EASILY: 0
ROS GI COMMENTS: DISTENTION
MEMORY LOSS: 1
SHORTNESS OF BREATH: 0
PALPITATIONS: 0
FOCAL WEAKNESS: 0
WEAKNESS: 1
HEADACHES: 0
DIZZINESS: 0
HALLUCINATIONS: 0
VOMITING: 0
HEARTBURN: 0
MYALGIAS: 0
COUGH: 0
CHILLS: 0
NAUSEA: 0

## 2017-08-20 ASSESSMENT — PATIENT HEALTH QUESTIONNAIRE - PHQ9
2. FEELING DOWN, DEPRESSED, IRRITABLE, OR HOPELESS: NOT AT ALL
1. LITTLE INTEREST OR PLEASURE IN DOING THINGS: NOT AT ALL
SUM OF ALL RESPONSES TO PHQ9 QUESTIONS 1 AND 2: 0
SUM OF ALL RESPONSES TO PHQ QUESTIONS 1-9: 0

## 2017-08-20 ASSESSMENT — PAIN SCALES - GENERAL: PAINLEVEL_OUTOF10: 2

## 2017-08-20 ASSESSMENT — LIFESTYLE VARIABLES: DO YOU DRINK ALCOHOL: NO

## 2017-08-20 NOTE — ASSESSMENT & PLAN NOTE
Has had bleeding the past, no evidence of this currently.  -Continue propranolol  -Hold all anticoagulation

## 2017-08-20 NOTE — PROGRESS NOTES
No BM since last night, patient is generalized weakness. Monitor Intake and output. Will give patient a bed bath.

## 2017-08-20 NOTE — ASSESSMENT & PLAN NOTE
No evidence of A. fib during this hospital stay.  -Too high-risk for intake regulation given coagulopathy in cirrhosis and history of esophageal bleeding.  -Rate is controlled.  -Continue propranolol

## 2017-08-20 NOTE — PROGRESS NOTES
Family members at bedside, got a schedule Liver Specialist in [facility] Tuesday morning. Family already arrange there trip .

## 2017-08-20 NOTE — PROGRESS NOTES
Pt has small smear of BM.  Pt cleaned and repositioned.  Pt given nightly medications.  No questions or concerns to address at this time.

## 2017-08-20 NOTE — PROGRESS NOTES
Pt checked for incontinence.  Pads still dry.  Pt assisted in urinal usage.  During assistance, pt felt warm.  Pt temp checked orally @ 100.2.  Pt was covered up with approx 5 blankets.  Will remove all blanket but 1 and recheck temp.  Will continue to monitor situation.

## 2017-08-20 NOTE — PROGRESS NOTES
Patient is awake, BP was low. Did held medications. On reverse trendelenburg. Will check BP in 15 minutes.

## 2017-08-20 NOTE — ASSESSMENT & PLAN NOTE
End stage liver disease.  He is meeting with the GI physician, liver specialist in [place] on Wednesday.  -Very poor prognosis  -Maximize medications to stabilize for travel to [place] for this apt  -I discussed goals of care in detail with him and his mother at bedside.  Spent 35min during this discussion. He wishes to remain full code, has not established limites on duration on life support but I encouraged him to do this.

## 2017-08-20 NOTE — PROGRESS NOTES
[facility] Hospitalist Progress Note    Date of Service: 2017    Chief Complaint  47 y.o. male w/ h/o ETOH cirrhosis admitted 2017 for altered mentation. He has known complications of coagulopathy, anemia, hx of GI bleed secondary to esophageal varices, encephalopathy in the past. [facility] was noted to have elevated ammonia levels and was transferred to our facility for higher level of care.  He has missed rare doses of lactulose, he has not been having bowel movements. [facility] had a guaiac positive stool but no evidence of gross bleeding. He states that he has had no alcohol since January and his blood alcohol level is negative.    Interval Problem Update  : Mental status improved. He will drop, no melena or hematochezia.    Consultants/Specialty  None    Disposition  He has an appointment with a liver specialist in [place] on Wednesday.  We'll maximize medications, and sure hemoglobin is stable and hope to [place] to make this appointment.        Review of Systems   Constitutional: Positive for malaise/fatigue. Negative for fever and chills.   HENT: Negative for sore throat.    Respiratory: Negative for cough and shortness of breath.    Cardiovascular: Negative for chest pain and palpitations.   Gastrointestinal: Negative for heartburn, nausea, vomiting, abdominal pain, diarrhea, blood in stool and melena.        Distention   Genitourinary: Negative for dysuria and frequency.        Dark urine   Musculoskeletal: Negative for myalgias and back pain.   Neurological: Positive for weakness. Negative for dizziness, focal weakness and headaches.   Endo/Heme/Allergies: Does not bruise/bleed easily.   Psychiatric/Behavioral: Positive for memory loss. Negative for depression and hallucinations.        Confusion, improved today.   All other systems reviewed and are negative.     Physical Exam  Laboratory/Imaging   Hemodynamics  Temp (24hrs), Av.1 °C  (98.7 °F), Min:36.6 °C (97.9 °F), Max:37.9 °C (100.2 °F)   Temperature: 37.1 °C (98.8 °F)  Pulse  Av.3  Min: 70  Max: 91    Blood Pressure: 112/58 mmHg      Respiratory      Respiration: 18, Pulse Oximetry: 98 %        RUL Breath Sounds: Diminished, RML Breath Sounds: Diminished, RLL Breath Sounds: Diminished, JONO Breath Sounds: Diminished, LLL Breath Sounds: Diminished    Fluids    Intake/Output Summary (Last 24 hours) at 17 1421  Last data filed at 17 1000   Gross per 24 hour   Intake    280 ml   Output   1250 ml   Net   -970 ml       Nutrition  Orders Placed This Encounter   Procedures   • Diet Order     Standing Status: Standing      Number of Occurrences: 1      Standing Expiration Date:      Order Specific Question:  Diet:     Answer:  Hepatic [9]     Order Specific Question:  Diet:     Answer:  Renal [8]     Physical Exam   Constitutional: He is oriented to person, place, and time. He appears well-developed and well-nourished. No distress.   Chronically ill-appearing.   HENT:   Head: Normocephalic.   Mouth/Throat: No oropharyngeal exudate.   Eyes: Conjunctivae are normal. Pupils are equal, round, and reactive to light.   Neck: Normal range of motion. No tracheal deviation present.   Cardiovascular: Normal rate and regular rhythm.    No murmur heard.  Pulmonary/Chest: Effort normal. No respiratory distress. He has no wheezes. He exhibits no tenderness.   Abdominal: Soft. He exhibits distension. There is no tenderness. There is no guarding.   Musculoskeletal: Normal range of motion. He exhibits no edema or tenderness.   Lymphadenopathy:     He has no cervical adenopathy.   Neurological: He is alert and oriented to person, place, and time. No cranial nerve deficit.   Skin: Skin is warm and dry. No rash noted.   Psychiatric:   Poor insight.   Nursing note and vitals reviewed.      Recent Labs      17   0432  17   0140   WBC  8.0  5.2   RBC  2.56*  2.06*   HEMOGLOBIN  8.6*  7.0*    HEMATOCRIT  25.6*  20.8*   MCV  100.0*  101.0*   MCH  33.6*  34.0*   MCHC  33.6*  33.7   RDW  61.3*  64.8*   PLATELETCT  34*  25*   MPV  10.4  9.7     Recent Labs      08/19/17   0432  08/20/17   0140   SODIUM  123*  125*   POTASSIUM  5.2  4.7   CHLORIDE  100  102   CO2  17*  19*   GLUCOSE  95  99   BUN  36*  31*   CREATININE  1.55*  1.25   CALCIUM  8.4*  8.1*     Recent Labs      08/19/17   0432   APTT  63.9*   INR  4.52*                  Assessment/Plan     * Hepatic encephalopathy (CMS-HCC)  Assessment & Plan  The patient's encephalopathy has resolved to his baseline. Suspect this is secondary to his medical noncompliance with his lactulose versus under dosing as he has not had consistent bowel movements. I will resume his home medications including his Xifaxan and monitor him closely. The patient carries a known history of alcoholic liver cirrhosis with associated coagulopathy. He stopped drinking in January. Blood alcohol levels drawn at the outlying facility are negative.  -Increase lactulose  -Resume rifaximin    UTI (urinary tract infection)  Assessment & Plan  Patient has a positive urinalysis, he will be covered empirically with ceftriaxone and will monitor urine cultures.  -Will treat for 5 days given this is all located UTI.  -Follow up cultures    Liver cirrhosis (CMS-HCC) (present on admission)  Assessment & Plan  End stage liver disease.  He is meeting with the GI physician, liver specialist in [place] on Wednesday.  -Very poor prognosis  -Maximize medications to stabilize for travel to [place] for this apt  -I discussed goals of care in detail with him and his mother at bedside.  Spent 35min during this discussion. He wishes to remain full code, has not established limites on duration on life support but I encouraged him to do this.    Esophageal varices (CMS-HCC) (present on admission)  Assessment & Plan  Has had bleeding the past, no evidence of this currently.  -Continue propranolol  -Hold  all anticoagulation    Protein-calorie malnutrition, severe (CMS-[facility]) (present on admission)  Assessment & Plan  In addition to poor synthetic function    Paroxysmal atrial fibrillation (CMS-[facility]) (present on admission)  Assessment & Plan  No evidence of A. fib during this hospital stay.  -Too high-risk for intake regulation given coagulopathy in cirrhosis and history of esophageal bleeding.  -Rate is controlled.  -Continue propranolol    Thrombocytopenia (CMS-HCC)  Assessment & Plan  The patient's platelet levels have dropped in the past year. This is secondary to his liver disease and poor synthetic function. He did have a guaiac positive stool at the outlying facility but he has no evidence of active GI bleed. We will monitor him closely during this hospitalization and trend his platelet levels.  -Monitor for bleeding    Hyponatremia  Assessment & Plan  The patient's sodium levels are lower than his previous hospitalization. Again this is likely secondary to his liver disease. His mentation at the time of my exam at bedside was alert and oriented. We will continue to monitor, we may need to initiate fluid restrictions.  -Resume Lasix 20  -Spironolactone 50    GODWIN (acute kidney injury) (CMS-HCC)  Assessment & Plan  Has improved w holding diuretics, suspect he was dry.  -Resume lasix/spirono at half dose  -Cr in AM  -Monitor Cr Daily  -Watch UOP  -Hold nephrotoxins      Coagulopathy (CMS-HCC)  Assessment & Plan  Due to liver disease, inr >  -Start daily Vit K 2.5 mg  -Monitor for bleeding    Macrocytic anemia  Assessment & Plan  No evidence of bleeding.  Baseline is near 8. This is related to cirrhosis and also history of GI bleeding, no evidence of active bleeding.  -Repeat hemoglobin in the morning  -Transfuse if less than 7        Radiology images reviewed, Medications reviewed and Labs reviewed  Garzon catheter: No Garzon      DVT Prophylaxis: Contraindicated - High bleeding risk (Coagulopathy)  DVT prophylaxis -  mechanical: SCDs              35 minutes were spent discussing goals of care with family at bedside, this was in addition to the time of the initial visit with the patient for total time of 65min.  All questions answered.  Greater than 50% of this time was at the patient's bedside.

## 2017-08-20 NOTE — PROGRESS NOTES
Calling [facility] in [place]. Its close un able to update patient Med Rec. Got list of his medication from her mother.

## 2017-08-20 NOTE — PROGRESS NOTES
Notify MD about BP, continue monitoring patient. Drinking water and watching TV. No discomfort. Alert and oriented.

## 2017-08-20 NOTE — ASSESSMENT & PLAN NOTE
No evidence of bleeding.  Baseline is near 8. This is related to cirrhosis and also history of GI bleeding, no evidence of active bleeding.  -Repeat hemoglobin in the morning  -Transfuse if less than 7

## 2017-08-20 NOTE — PROGRESS NOTES
Call from Oriana in the lab.  Pt with critical value of platelets @ 25,000.  Level verified with readback.    Paged hospitalist @ 0216.  Returned call @ 0218 from Natasha Oliver NP.  NP notified of platelet count 25,000.  No new orders at this time.

## 2017-08-21 VITALS
OXYGEN SATURATION: 96 % | RESPIRATION RATE: 18 BRPM | HEART RATE: 61 BPM | DIASTOLIC BLOOD PRESSURE: 53 MMHG | BODY MASS INDEX: 35.51 KG/M2 | SYSTOLIC BLOOD PRESSURE: 96 MMHG | WEIGHT: 248.02 LBS | HEIGHT: 70 IN | TEMPERATURE: 98 F

## 2017-08-21 PROBLEM — D53.9 MACROCYTIC ANEMIA: Chronic | Status: ACTIVE | Noted: 2017-08-20

## 2017-08-21 PROBLEM — D68.9 COAGULOPATHY (HCC): Chronic | Status: ACTIVE | Noted: 2017-08-20

## 2017-08-21 PROBLEM — E87.1 HYPONATREMIA: Chronic | Status: ACTIVE | Noted: 2017-08-19

## 2017-08-21 PROBLEM — D69.6 THROMBOCYTOPENIA (HCC): Chronic | Status: ACTIVE | Noted: 2017-08-19

## 2017-08-21 LAB
ALBUMIN SERPL BCP-MCNC: 1.5 G/DL (ref 3.2–4.9)
ALBUMIN/GLOB SERPL: 0.5 G/DL
ALP SERPL-CCNC: 87 U/L (ref 30–99)
ALT SERPL-CCNC: 41 U/L (ref 2–50)
ANION GAP SERPL CALC-SCNC: 3 MMOL/L (ref 0–11.9)
AST SERPL-CCNC: 68 U/L (ref 12–45)
BILIRUB SERPL-MCNC: 12.2 MG/DL (ref 0.1–1.5)
BUN SERPL-MCNC: 27 MG/DL (ref 8–22)
CALCIUM SERPL-MCNC: 8.1 MG/DL (ref 8.5–10.5)
CHLORIDE SERPL-SCNC: 99 MMOL/L (ref 96–112)
CO2 SERPL-SCNC: 19 MMOL/L (ref 20–33)
CREAT SERPL-MCNC: 1.19 MG/DL (ref 0.5–1.4)
ERYTHROCYTE [DISTWIDTH] IN BLOOD BY AUTOMATED COUNT: 64.6 FL (ref 35.9–50)
GFR SERPL CREATININE-BSD FRML MDRD: >60 ML/MIN/1.73 M 2
GLOBULIN SER CALC-MCNC: 3.1 G/DL (ref 1.9–3.5)
GLUCOSE SERPL-MCNC: 93 MG/DL (ref 65–99)
HCT VFR BLD AUTO: 21.5 % (ref 42–52)
HGB BLD-MCNC: 7.1 G/DL (ref 14–18)
MCH RBC QN AUTO: 33.3 PG (ref 27–33)
MCHC RBC AUTO-ENTMCNC: 33 G/DL (ref 33.7–35.3)
MCV RBC AUTO: 100.9 FL (ref 81.4–97.8)
MORPHOLOGY BLD-IMP: NORMAL
PLATELET # BLD AUTO: 26 K/UL (ref 164–446)
PLATELETS.RETICULATED NFR BLD AUTO: 1.8 K/UL (ref 0.6–13.1)
PMV BLD AUTO: 10.1 FL (ref 9–12.9)
POTASSIUM SERPL-SCNC: 4.2 MMOL/L (ref 3.6–5.5)
PROT SERPL-MCNC: 4.6 G/DL (ref 6–8.2)
RBC # BLD AUTO: 2.13 M/UL (ref 4.7–6.1)
SODIUM SERPL-SCNC: 121 MMOL/L (ref 135–145)
WBC # BLD AUTO: 6.7 K/UL (ref 4.8–10.8)

## 2017-08-21 PROCEDURE — A9270 NON-COVERED ITEM OR SERVICE: HCPCS | Performed by: HOSPITALIST

## 2017-08-21 PROCEDURE — 700102 HCHG RX REV CODE 250 W/ 637 OVERRIDE(OP): Performed by: HOSPITALIST

## 2017-08-21 PROCEDURE — 85027 COMPLETE CBC AUTOMATED: CPT

## 2017-08-21 PROCEDURE — 36415 COLL VENOUS BLD VENIPUNCTURE: CPT

## 2017-08-21 PROCEDURE — G0378 HOSPITAL OBSERVATION PER HR: HCPCS

## 2017-08-21 PROCEDURE — 85055 RETICULATED PLATELET ASSAY: CPT

## 2017-08-21 PROCEDURE — 99239 HOSP IP/OBS DSCHRG MGMT >30: CPT | Performed by: INTERNAL MEDICINE

## 2017-08-21 PROCEDURE — A9270 NON-COVERED ITEM OR SERVICE: HCPCS | Performed by: INTERNAL MEDICINE

## 2017-08-21 PROCEDURE — 80053 COMPREHEN METABOLIC PANEL: CPT

## 2017-08-21 PROCEDURE — 700102 HCHG RX REV CODE 250 W/ 637 OVERRIDE(OP): Performed by: INTERNAL MEDICINE

## 2017-08-21 RX ORDER — LACTULOSE 10 G/15ML
20 SOLUTION ORAL 2 TIMES DAILY
Qty: 1 BOTTLE | Refills: 0
Start: 2017-08-21

## 2017-08-21 RX ADMIN — PHYTONADIONE 2.5 MG: 5 TABLET ORAL at 09:15

## 2017-08-21 RX ADMIN — LACTULOSE 30 ML: 10 SOLUTION ORAL at 15:27

## 2017-08-21 RX ADMIN — FUROSEMIDE 20 MG: 20 TABLET ORAL at 09:14

## 2017-08-21 RX ADMIN — Medication 100 MG: at 09:17

## 2017-08-21 RX ADMIN — LACTULOSE 30 ML: 10 SOLUTION ORAL at 09:13

## 2017-08-21 RX ADMIN — SUCRALFATE 1 G: 1 SUSPENSION ORAL at 12:10

## 2017-08-21 RX ADMIN — OMEPRAZOLE 40 MG: 20 CAPSULE, DELAYED RELEASE ORAL at 09:14

## 2017-08-21 RX ADMIN — SPIRONOLACTONE 50 MG: 50 TABLET ORAL at 09:17

## 2017-08-21 RX ADMIN — SUCRALFATE 1 G: 1 SUSPENSION ORAL at 09:13

## 2017-08-21 RX ADMIN — RIFAXIMIN 550 MG: 550 TABLET ORAL at 09:14

## 2017-08-21 ASSESSMENT — LIFESTYLE VARIABLES
DO YOU DRINK ALCOHOL: NO
EVER_SMOKED: NEVER

## 2017-08-21 ASSESSMENT — PATIENT HEALTH QUESTIONNAIRE - PHQ9
SUM OF ALL RESPONSES TO PHQ QUESTIONS 1-9: 0
SUM OF ALL RESPONSES TO PHQ9 QUESTIONS 1 AND 2: 0
2. FEELING DOWN, DEPRESSED, IRRITABLE, OR HOPELESS: NOT AT ALL
1. LITTLE INTEREST OR PLEASURE IN DOING THINGS: NOT AT ALL

## 2017-08-21 ASSESSMENT — PAIN SCALES - GENERAL: PAINLEVEL_OUTOF10: 2

## 2017-08-21 NOTE — PROGRESS NOTES
Silvana from Lab called with critical result of Platelets-26 at 0348. Critical lab result read back to [facility].   Dr. Oliver notified of critical lab result at 0402.  Critical lab result read back by Dr. Oliver.

## 2017-08-21 NOTE — DISCHARGE INSTRUCTIONS
Discharge Instructions    Discharged to home by car with relative. Discharged via wheelchair, hospital escort: Yes.  Special equipment needed: Wheelchair    Be sure to schedule a follow-up appointment with your primary care doctor or any specialists as instructed.     Discharge Plan:   Diet Plan: Discussed  Activity Level: Discussed  Confirmed Follow up Appointment: Appointment Scheduled  Confirmed Symptoms Management: Discussed  Medication Reconciliation Updated: Yes  Influenza Vaccine Indication: Indicated: Not available from distributor/    I understand that a diet low in cholesterol, fat, and sodium is recommended for good health. Unless I have been given specific instructions below for another diet, I accept this instruction as my diet prescription.   Other diet: Low Sodium Diet    Special Instructions: None    · Is patient discharged on Warfarin / Coumadin?   No     · Is patient Post Blood Transfusion?  No    Depression / Suicide Risk    As you are discharged from this [facility] facility, it is important to learn how to keep safe from harming yourself.    Recognize the warning signs:  · Abrupt changes in personality, positive or negative- including increase in energy   · Giving away possessions  · Change in eating patterns- significant weight changes-  positive or negative  · Change in sleeping patterns- unable to sleep or sleeping all the time   · Unwillingness or inability to communicate  · Depression  · Unusual sadness, discouragement and loneliness  · Talk of wanting to die  · Neglect of personal appearance   · Rebelliousness- reckless behavior  · Withdrawal from people/activities they love  · Confusion- inability to concentrate     If you or a loved one observes any of these behaviors or has concerns about self-harm, here's what you can do:  · Talk about it- your feelings and reasons for harming yourself  · Remove any means that you might use to hurt yourself (examples: pills, rope,  extension cords, firearm)  · Get professional help from the community (Mental Health, Substance Abuse, psychological counseling)  · Do not be alone:Call your Safe Contact- someone whom you trust who will be there for you.  · Call your local CRISIS HOTLINE 773-0391 or 633-202-4377  · Call your local Children's Mobile Crisis Response Team [place] (083) 760-4761 or www.[employer]  · Call the toll free National Suicide Prevention Hotlines   · National Suicide Prevention Lifeline 987-518-ITPX (2132)  · National Hope Line Network 800-SUICIDE (011-1337)

## 2017-08-21 NOTE — DISCHARGE SUMMARY
CHIEF COMPLAINT ON ADMISSION  Chief Complaint   Patient presents with   • GI Problem     hx of GI bleeds and varcies        CODE STATUS  Full Code    HPI & HOSPITAL COURSE  This is a 47 y.o. male here with h/o ETOH cirrhosis admitted 8/19/2017 for altered mentation. He has known complications of coagulopathy, anemia, hx of GI bleed secondary to esophageal varices, encephalopathy in the past. [facility], he was noted to have elevated ammonia levels and was transferred to our facility for higher level of care.  He has missed rare doses of lactulose, he has not been having bowel movements. [facility], the patient had a guaiac positive stool but no evidence of gross bleeding. He states that he has had no alcohol since January and his blood alcohol level is negative. His mentation improved with lactulose. He has an appt for extensive evaluation with the liver transplant team in [facility] tomorrow morning. His vitals and condition were very stable even with Hgb 7.1, plts 26, and Na 121, these likely will not improved given his severe liver disease. We felt that he was stable enough to get him to the vital appointment at [facility]. He of course is advised to go the ER for any significant changes in his condition.    Therefore, he is discharged in fair and stable condition with close outpatient follow-up.    SPECIFIC OUTPATIENT FOLLOW-UP  PCP within 7-10days  [facility] Liver transplant team tomorrow at 0700    DISCHARGE PROBLEM LIST  Principal Problem:    Hepatic encephalopathy (CMS-HCC) POA: Yes  Active Problems:    UTI (urinary tract infection) POA: Yes    Liver cirrhosis (CMS-HCC) (Chronic) POA: Yes    Esophageal varices (CMS-HCC) (Chronic) POA: Yes    Protein-calorie malnutrition, severe (CMS-HCC) POA: Yes    Paroxysmal atrial fibrillation (CMS-HCC) (Chronic) POA: Yes    Thrombocytopenia (CMS-HCC) (Chronic) POA: Yes    Hyponatremia (Chronic) POA: Yes    GODWIN (acute kidney injury)  (CMS-HCC) POA: Yes    Coagulopathy (CMS-HCC) (Chronic) POA: Yes    Macrocytic anemia (Chronic) POA: Yes  Resolved Problems:    Acute kidney injury (CMS-HCC) POA: Yes      FOLLOW UP  No future appointments.  Héctor Cifuentes M.D.  [address]  [facility] [address]  [phone]    Schedule an appointment as soon as possible for a visit      [facility] transplant center    Go on 8/22/2017        MEDICATIONS ON DISCHARGE   Froylan Catherine   Home Medication Instructions ADRIAN:[id number]    Printed on:08/21/17 1158   Medication Information                      carvedilol (COREG) 6.25 MG Tab  Take 6.25 mg by mouth 2 times a day, with meals.             lactulose 10 GM/15ML Solution  Take 30 mL by mouth 2 times a day.             omeprazole (PRILOSEC) 20 MG delayed-release capsule  Take 40 mg by mouth every day.             rifaximin (XIFAXAN) 550 MG Tab tablet  Take 550 mg by mouth 2 Times a Day.             therapeutic multivitamin-minerals (THERAGRAN-M) Tab  Take 1 Tab by mouth every day.                 DIET  Orders Placed This Encounter   Procedures   • Diet Order     Standing Status: Standing      Number of Occurrences: 1      Standing Expiration Date:      Order Specific Question:  Diet:     Answer:  Hepatic [9]     Order Specific Question:  Diet:     Answer:  Renal [8]       ACTIVITY  As tolerated.  Weight bearing as tolerated      CONSULTATIONS  None      PROCEDURES  None      LABORATORY  Lab Results   Component Value Date/Time    SODIUM 121* 08/21/2017 03:32 AM    POTASSIUM 4.2 08/21/2017 03:32 AM    CHLORIDE 99 08/21/2017 03:32 AM    CO2 19* 08/21/2017 03:32 AM    GLUCOSE 93 08/21/2017 03:32 AM    BUN 27* 08/21/2017 03:32 AM    CREATININE 1.19 08/21/2017 03:32 AM        Lab Results   Component Value Date/Time    WBC 6.7 08/21/2017 03:32 AM    HEMOGLOBIN 7.1* 08/21/2017 03:32 AM    HEMATOCRIT 21.5* 08/21/2017 03:32 AM    PLATELET COUNT 26* 08/21/2017 03:32 AM        Total time of the discharge process exceeds 36  minutes

## 2017-08-21 NOTE — PROGRESS NOTES
Got bed bath, IV line removed. Got new set of clothes. Two person assist got him up and transfer to his wheel chair. Waiting for family members to take patient to [place].

## 2017-08-21 NOTE — DISCHARGE PLANNING
Care Transition Team Assessment    Information Source  Orientation : Oriented x 4  Information Given By: Patient         Elopement Risk  Legal Hold: No  Ambulatory or Self Mobile in Wheelchair: No-Not an Elopement Risk  Elopement Risk: Not at Risk for Elopement    Interdisciplinary Discharge Planning  Does Admitting Nurse Feel This Could be a Complex Discharge?: Yes  Primary Care Physician: Dr. Cifuentes  Lives with - Patient's Self Care Capacity: Parents  Patient or legal guardian wants to designate a caregiver (see row info): No  Support Systems: Family Member(s)  Housing / Facility: [address]  Do You Take your Prescribed Medications Regularly: No  Reasons Why Not Taking Medications :  (doesn't say why)  Able to Return to Previous ADL's: Future Time w/Therapy  Mobility Issues: Yes  Prior Services: [employer]  Patient Expects to be Discharged to:: home  Assistance Needed: No  Durable Medical Equipment: Not Applicable                   Vision / Hearing Impairment  Vision Impairment : Yes  Right Eye Vision: Impaired  Left Eye Vision: Wears Glasses  Hearing Impairment : No    Values / Beliefs / Concerns  Values / Beliefs Concerns : No         Domestic Abuse  Have you ever been the victim of abuse or violence?: No  Physical Abuse or Sexual Abuse: No  Verbal Abuse or Emotional Abuse: No  Possible Abuse Reported to:: Not Applicable

## 2017-08-21 NOTE — PROGRESS NOTES
Report given by Karma EASLEY. Assumed care of pt. Pt sitting up in bed eating dinner. Denies needs at this time. /55.

## 2017-08-21 NOTE — PROGRESS NOTES
Assumed patient care. Awake. BP much better. Discuss about the plan for today. Got his wheel chair at bedside.

## 2017-08-22 NOTE — PROGRESS NOTES
Was able to have a BM, got him on the bathroom and transfer to chair. Family is here for discharged. Going to [place].

## 2021-04-16 ENCOUNTER — HOSPITAL ENCOUNTER (OUTPATIENT)
Dept: RADIOLOGY | Facility: MEDICAL CENTER | Age: 51
End: 2021-04-16
Payer: COMMERCIAL
